# Patient Record
Sex: MALE | Race: WHITE | NOT HISPANIC OR LATINO | Employment: OTHER | URBAN - METROPOLITAN AREA
[De-identification: names, ages, dates, MRNs, and addresses within clinical notes are randomized per-mention and may not be internally consistent; named-entity substitution may affect disease eponyms.]

---

## 2019-01-15 ENCOUNTER — OFFICE VISIT (OUTPATIENT)
Dept: URGENT CARE | Facility: PHYSICIAN GROUP | Age: 79
End: 2019-01-15
Payer: MEDICARE

## 2019-01-15 ENCOUNTER — HOSPITAL ENCOUNTER (OUTPATIENT)
Facility: MEDICAL CENTER | Age: 79
End: 2019-01-15
Attending: PHYSICIAN ASSISTANT
Payer: MEDICARE

## 2019-01-15 VITALS
BODY MASS INDEX: 23.04 KG/M2 | HEART RATE: 54 BPM | OXYGEN SATURATION: 97 % | WEIGHT: 152 LBS | DIASTOLIC BLOOD PRESSURE: 66 MMHG | TEMPERATURE: 98 F | SYSTOLIC BLOOD PRESSURE: 124 MMHG | RESPIRATION RATE: 14 BRPM | HEIGHT: 68 IN

## 2019-01-15 DIAGNOSIS — R31.9 HEMATURIA, UNSPECIFIED TYPE: ICD-10-CM

## 2019-01-15 DIAGNOSIS — R35.0 FREQUENT URINATION: ICD-10-CM

## 2019-01-15 DIAGNOSIS — R82.998 LEUKOCYTES IN URINE: ICD-10-CM

## 2019-01-15 LAB
APPEARANCE UR: CLEAR
BILIRUB UR STRIP-MCNC: NORMAL MG/DL
COLOR UR AUTO: NORMAL
GLUCOSE UR STRIP.AUTO-MCNC: NORMAL MG/DL
KETONES UR STRIP.AUTO-MCNC: NORMAL MG/DL
LEUKOCYTE ESTERASE UR QL STRIP.AUTO: NORMAL
NITRITE UR QL STRIP.AUTO: NORMAL
PH UR STRIP.AUTO: 6 [PH] (ref 5–8)
PROT UR QL STRIP: NORMAL MG/DL
RBC UR QL AUTO: NORMAL
SP GR UR STRIP.AUTO: 1.02
UROBILINOGEN UR STRIP-MCNC: 0.2 MG/DL

## 2019-01-15 PROCEDURE — 87086 URINE CULTURE/COLONY COUNT: CPT

## 2019-01-15 PROCEDURE — 81002 URINALYSIS NONAUTO W/O SCOPE: CPT | Performed by: PHYSICIAN ASSISTANT

## 2019-01-15 PROCEDURE — 99204 OFFICE O/P NEW MOD 45 MIN: CPT | Performed by: PHYSICIAN ASSISTANT

## 2019-01-15 RX ORDER — ASPIRIN 81 MG/1
81 TABLET, COATED ORAL
Refills: 3 | COMMUNITY
Start: 2018-11-06

## 2019-01-15 RX ORDER — SULFAMETHOXAZOLE AND TRIMETHOPRIM 800; 160 MG/1; MG/1
1 TABLET ORAL EVERY 12 HOURS
Qty: 10 TAB | Refills: 0 | Status: SHIPPED | OUTPATIENT
Start: 2019-01-15 | End: 2019-01-20

## 2019-01-15 RX ORDER — CLOPIDOGREL BISULFATE 75 MG/1
75 TABLET ORAL
Refills: 9 | COMMUNITY
Start: 2018-11-04

## 2019-01-15 RX ORDER — ATORVASTATIN CALCIUM 40 MG/1
40 TABLET, FILM COATED ORAL
Refills: 9 | COMMUNITY
Start: 2018-11-04

## 2019-01-15 RX ORDER — LISINOPRIL 5 MG/1
5 TABLET ORAL
Refills: 9 | COMMUNITY
Start: 2018-11-04

## 2019-01-15 ASSESSMENT — ENCOUNTER SYMPTOMS
CHILLS: 0
FEVER: 0
SHORTNESS OF BREATH: 0
VOMITING: 0
DIARRHEA: 0
ABDOMINAL PAIN: 0
MYALGIAS: 0
BACK PAIN: 0
FLANK PAIN: 0
NAUSEA: 0

## 2019-01-15 NOTE — PROGRESS NOTES
"  Subjective:     Miguel A Bullard is a 78 y.o. male who presents for Blood in Urine (Has been experiencing blood in urine on/off x 2 weeks)       Notes intermittent hematuria, twice over last two years, has not seen urology for this (tried but did not get along w/ him), notes some dysuria, freq urination, denies PMH of UTI, denies PMH of kidney stones, this time has been two weeks, denies dysuria, same freq awoke 4x last night, notes scant amount w/ urine stream. Denies fever/chills/nauesa/vomiting, denies abd or back pain. Denies trying tx. Keithconstantine Chandra was urologist he would like to avoid (in [facility]).       Other   This is a new problem. The current episode started in the past 7 days. Associated symptoms include urinary symptoms. Pertinent negatives include no abdominal pain, chills, fever, myalgias, nausea, rash or vomiting.   No past medical history on file.No past surgical history on file.  Social History     Social History   • Marital status: Single     Spouse name: N/A   • Number of children: N/A   • Years of education: N/A     Occupational History   • Not on file.     Social History Main Topics   • Smoking status: Not on file   • Smokeless tobacco: Not on file   • Alcohol use Not on file   • Drug use: Unknown   • Sexual activity: Not on file     Other Topics Concern   • Not on file     Social History Narrative   • No narrative on file    No family history on file. Review of Systems   Constitutional: Negative for chills and fever.   Respiratory: Negative for shortness of breath.    Gastrointestinal: Negative for abdominal pain, diarrhea, nausea and vomiting.   Genitourinary: Positive for frequency and hematuria. Negative for dysuria, flank pain and urgency.   Musculoskeletal: Negative for back pain and myalgias.   Skin: Negative for rash.   No Known Allergies   Objective:   /66   Pulse (!) 54   Temp 36.7 °C (98 °F) (Temporal)   Resp 14   Ht 1.727 m (5' 8\")   Wt 68.9 kg (152 lb)   SpO2 97%   " BMI 23.11 kg/m²   Physical Exam   Constitutional: He is oriented to person, place, and time. He appears well-developed and well-nourished. No distress.   HENT:   Head: Normocephalic and atraumatic.   Right Ear: External ear normal.   Left Ear: External ear normal.   Nose: Nose normal.   Eyes: Conjunctivae are normal. Right eye exhibits no discharge. Left eye exhibits no discharge. No scleral icterus.   Neck: Neck supple.   Pulmonary/Chest: Effort normal. No respiratory distress.   Abdominal: Soft. Normal appearance. There is no tenderness ( ). There is no rigidity, no guarding and no CVA tenderness.   Musculoskeletal: Normal range of motion.   Neurological: He is alert and oriented to person, place, and time. Coordination normal.   Skin: Skin is warm and dry. He is not diaphoretic. No pallor.   Psychiatric: He has a normal mood and affect.   Nursing note and vitals reviewed.    Results for orders placed or performed in visit on 01/15/19   POCT Urinalysis   Result Value Ref Range    POC Color Dark Yellow Negative    POC Appearance Clear Negative    POC Leukocyte Esterase Small Negative    POC Nitrites Neg Negative    POC Urobiligen 0.2 Negative (0.2) mg/dL    POC Protein Neg Negative mg/dL    POC Urine PH 6.0 5.0 - 8.0    POC Blood Moderate Negative    POC Specific Gravity 1.020 <1.005 - >1.030    POC Ketones Neg Negative mg/dL    POC Bilirubin Neg Negative mg/dL    POC Glucose Neg Negative mg/dL     Urine cx pending  Urology referral pending    [phone] - cx call back    Assessment/Plan:   Assessment    1. Hematuria, unspecified type  - POCT Urinalysis  - URINE CULTURE(NEW); Future  - REFERRAL TO UROLOGY  - sulfamethoxazole-trimethoprim (BACTRIM DS) 800-160 MG tablet; Take 1 Tab by mouth every 12 hours for 5 days.  Dispense: 10 Tab; Refill: 0    2. Leukocytes in urine  - REFERRAL TO UROLOGY  - sulfamethoxazole-trimethoprim (BACTRIM DS) 800-160 MG tablet; Take 1 Tab by mouth every 12 hours for 5 days.  Dispense:  10 Tab; Refill: 0    3. Frequent urination    Other orders  - clopidogrel (PLAVIX) 75 MG Tab; Take 75 mg by mouth every day.; Refill: 9  - lisinopril (PRINIVIL) 5 MG Tab; Take 5 mg by mouth every day.; Refill: 9  - metoprolol (LOPRESSOR) 25 MG Tab; TAKE 1 TABLET BY MOUTH TWICE A DAY; Refill: 9  - ASPIRIN ADULT LOW DOSE 81 MG EC tablet; Take 81 mg by mouth every day.; Refill: 3  - atorvastatin (LIPITOR) 40 MG Tab; Take 40 mg by mouth.; Refill: 9  Supportive care is reviewed with patient/caregiver - recommend to push PO fluids and electrolytes, nsaids/tylenol, rest, full course of abx, probiotics w/ abx, azo/cran, observe for resolution  Return to clinic with lack of resolution or progression of symptoms.  Will call if change of abx is indicated by urine cx  F/u w/ urology w/ PMH of recurrence of hematuria  Differential diagnosis, natural history, supportive care, and indications for immediate follow-up discussed.

## 2019-01-17 LAB
BACTERIA UR CULT: NORMAL
SIGNIFICANT IND 70042: NORMAL
SITE SITE: NORMAL
SOURCE SOURCE: NORMAL

## 2019-02-13 ENCOUNTER — HOSPITAL ENCOUNTER (OUTPATIENT)
Dept: HOSPITAL 8 - RAD | Age: 79
Discharge: HOME | End: 2019-02-13
Attending: UROLOGY
Payer: MEDICARE

## 2019-02-13 DIAGNOSIS — K40.90: Primary | ICD-10-CM

## 2019-02-13 DIAGNOSIS — N32.89: ICD-10-CM

## 2019-02-13 DIAGNOSIS — R31.0: ICD-10-CM

## 2019-02-13 PROCEDURE — 74178 CT ABD&PLV WO CNTR FLWD CNTR: CPT

## 2019-02-14 ENCOUNTER — HOSPITAL ENCOUNTER (OUTPATIENT)
Dept: HOSPITAL 8 - STAR | Age: 79
Discharge: HOME | End: 2019-02-14
Attending: UROLOGY
Payer: MEDICARE

## 2019-02-14 VITALS — DIASTOLIC BLOOD PRESSURE: 72 MMHG | SYSTOLIC BLOOD PRESSURE: 112 MMHG

## 2019-02-14 DIAGNOSIS — R94.31: ICD-10-CM

## 2019-02-14 DIAGNOSIS — C67.9: Primary | ICD-10-CM

## 2019-02-14 LAB
ALBUMIN SERPL-MCNC: 3.6 G/DL (ref 3.4–5)
ALP SERPL-CCNC: 95 U/L (ref 45–117)
ALT SERPL-CCNC: 45 U/L (ref 12–78)
ANION GAP SERPL CALC-SCNC: 6 MMOL/L (ref 5–15)
BILIRUB SERPL-MCNC: 0.5 MG/DL (ref 0.2–1)
CALCIUM SERPL-MCNC: 8.7 MG/DL (ref 8.5–10.1)
CHLORIDE SERPL-SCNC: 108 MMOL/L (ref 98–107)
CREAT SERPL-MCNC: 0.73 MG/DL (ref 0.7–1.3)
MICROSCOPIC: (no result)
PROT SERPL-MCNC: 7 G/DL (ref 6.4–8.2)

## 2019-02-14 PROCEDURE — 80053 COMPREHEN METABOLIC PANEL: CPT

## 2019-02-14 PROCEDURE — 81001 URINALYSIS AUTO W/SCOPE: CPT

## 2019-02-14 PROCEDURE — 87086 URINE CULTURE/COLONY COUNT: CPT

## 2019-02-14 PROCEDURE — 36415 COLL VENOUS BLD VENIPUNCTURE: CPT

## 2019-02-14 PROCEDURE — 93005 ELECTROCARDIOGRAM TRACING: CPT

## 2019-02-19 ENCOUNTER — HOSPITAL ENCOUNTER (OUTPATIENT)
Dept: HOSPITAL 8 - OUT | Age: 79
Discharge: HOME | End: 2019-02-19
Attending: UROLOGY
Payer: MEDICARE

## 2019-02-19 VITALS — SYSTOLIC BLOOD PRESSURE: 112 MMHG | DIASTOLIC BLOOD PRESSURE: 72 MMHG

## 2019-02-19 VITALS — HEIGHT: 68 IN | WEIGHT: 156.09 LBS | BODY MASS INDEX: 23.66 KG/M2

## 2019-02-19 DIAGNOSIS — D09.0: Primary | ICD-10-CM

## 2019-02-19 DIAGNOSIS — C67.4: ICD-10-CM

## 2019-02-19 DIAGNOSIS — Z79.82: ICD-10-CM

## 2019-02-19 DIAGNOSIS — I25.10: ICD-10-CM

## 2019-02-19 DIAGNOSIS — Z96.652: ICD-10-CM

## 2019-02-19 DIAGNOSIS — Z98.890: ICD-10-CM

## 2019-02-19 PROCEDURE — 51720 TREATMENT OF BLADDER LESION: CPT

## 2019-02-19 PROCEDURE — 88305 TISSUE EXAM BY PATHOLOGIST: CPT

## 2019-02-19 PROCEDURE — 52234 CYSTOSCOPY AND TREATMENT: CPT

## 2019-02-19 PROCEDURE — 88307 TISSUE EXAM BY PATHOLOGIST: CPT

## 2019-02-19 PROCEDURE — 76001: CPT

## 2019-02-19 PROCEDURE — 74018 RADEX ABDOMEN 1 VIEW: CPT

## 2019-06-04 ENCOUNTER — HOSPITAL ENCOUNTER (OUTPATIENT)
Dept: HOSPITAL 8 - OR | Age: 79
Discharge: HOME | End: 2019-06-04
Attending: UROLOGY
Payer: MEDICARE

## 2019-06-04 VITALS — BODY MASS INDEX: 23.89 KG/M2 | HEIGHT: 68 IN | WEIGHT: 157.63 LBS

## 2019-06-04 VITALS — SYSTOLIC BLOOD PRESSURE: 119 MMHG | DIASTOLIC BLOOD PRESSURE: 66 MMHG

## 2019-06-04 DIAGNOSIS — I10: ICD-10-CM

## 2019-06-04 DIAGNOSIS — Z79.82: ICD-10-CM

## 2019-06-04 DIAGNOSIS — I25.2: ICD-10-CM

## 2019-06-04 DIAGNOSIS — N30.90: ICD-10-CM

## 2019-06-04 DIAGNOSIS — Z95.5: ICD-10-CM

## 2019-06-04 DIAGNOSIS — N30.30: Primary | ICD-10-CM

## 2019-06-04 DIAGNOSIS — Z87.891: ICD-10-CM

## 2019-06-04 DIAGNOSIS — N32.89: ICD-10-CM

## 2019-06-04 DIAGNOSIS — Z96.652: ICD-10-CM

## 2019-06-04 DIAGNOSIS — Z79.899: ICD-10-CM

## 2019-06-04 PROCEDURE — 52204 CYSTOSCOPY W/BIOPSY(S): CPT

## 2019-06-04 PROCEDURE — C1769 GUIDE WIRE: HCPCS

## 2019-06-04 PROCEDURE — 88305 TISSUE EXAM BY PATHOLOGIST: CPT

## 2019-06-04 PROCEDURE — 52234 CYSTOSCOPY AND TREATMENT: CPT

## 2021-01-13 DIAGNOSIS — Z23 NEED FOR VACCINATION: ICD-10-CM
